# Patient Record
Sex: MALE | Race: OTHER
[De-identification: names, ages, dates, MRNs, and addresses within clinical notes are randomized per-mention and may not be internally consistent; named-entity substitution may affect disease eponyms.]

---

## 2021-05-13 ENCOUNTER — HOSPITAL ENCOUNTER (EMERGENCY)
Dept: HOSPITAL 61 - ER | Age: 36
Discharge: HOME | End: 2021-05-13
Payer: SELF-PAY

## 2021-05-13 VITALS — SYSTOLIC BLOOD PRESSURE: 117 MMHG | DIASTOLIC BLOOD PRESSURE: 68 MMHG

## 2021-05-13 VITALS — HEIGHT: 73 IN | BODY MASS INDEX: 29.22 KG/M2 | WEIGHT: 220.46 LBS

## 2021-05-13 DIAGNOSIS — R10.13: Primary | ICD-10-CM

## 2021-05-13 DIAGNOSIS — K21.9: ICD-10-CM

## 2021-05-13 PROCEDURE — 99285 EMERGENCY DEPT VISIT HI MDM: CPT

## 2021-05-13 NOTE — ED.ADGEN
Past Medical History


Past Medical History:  Other


Additional Past Medical Histor:  "acid reflux"


Past Surgical History:  No Surgical History


Smoking Status:  Never Smoker


Alcohol Use:  None





General Adult


EDM:


Chief Complaint:  ABDOMINAL PAIN





HPI:


HPI:





Patient is a 36  year old  male who presents emergency department with 

complaints of epigastric and right upper quadrant pain since approximately 1900.

 Patient reports he just got home from work when the pain began and he vomited 

once.  He denies any blood in his stools, or blood in his vomit.  Patient states

that his last BM was earlier today and it was normal.  He denies any abnormal 

color to his stools.  Patient states he was seen by his primary care doctor at 

[facility] 2 days ago who told him to stop taking his omeprazole for 2 weeks

so that they could do further testing.  Patient is not sure what testing they 

were going to do.  Patient also reports that he has been having frequent 

headaches but denies taking any ibuprofen for relief of the pain.  He currently 

rates the pain 8 out of 10 on the pain scale, he denies any alleviating or 

exacerbating factors, the pain does not get worse with position changes or 

eating.





Review of Systems:


Review of Systems:


Complete ROS is negative unless otherwise noted in HPI.





Current Medications:





Current Medications








 Medications


  (Trade)  Dose


 Ordered  Sig/Vicky  Start Time


 Stop Time Status Last Admin


Dose Admin


 


 Multi-Ingredient


 Mouthwash/Gargle


  (Gi Cocktail)  20 ml  1X  ONCE  5/13/21 21:30


 5/13/21 21:31 DC  














Allergies:


Allergies:





Allergies








Coded Allergies Type Severity Reaction Last Updated Verified


 


  No Known Drug Allergies    5/13/21 No











Physical Exam:


PE:


See Above


Constitutional: Well developed, well nourished, no acute distress, non-toxic 

appearance. []


HENT: Normocephalic, atraumatic, bilateral external ears normal, nose normal. []


Eyes: PERRLA, EOMI, conjunctiva normal, no discharge. [] 


Neck: Normal range of motion, no stridor. [] 


Cardiovascular:Heart rate regular rhythm


Lungs & Thorax:  Respirations even and unlabored, no retractions, no respiratory

 distress


Abdomen: soft, epigastric tenderness to palpation, right upper quadrant 

tenderness to palpation, no rebound tenderness, no guarding, no palpable mass


Skin: Warm, dry, no erythema, no rash. [] 


Extremities: No cyanosis, ROM intact, no edema. [] 


Neurologic: Alert and oriented X 3, no focal deficits noted. []


Psychologic: Affect normal, judgement normal, mood normal. []





Current Patient Data:


Vital Signs:





                                   Vital Signs








  Date Time  Temp Pulse Resp B/P (MAP) Pulse Ox O2 Delivery O2 Flow Rate FiO2


 


5/13/21 20:57 98.1 93 20 131/72 (91) 98 Room Air  





 98.1       











EKG:


EKG:


[]





Heart Score:


C/O Chest Pain:  No


Risk Scores:


Score 0 - 3:  2.5% MACE over next 6 weeks - Discharge Home


Score 4 - 6:  20.3% MACE over next 6 weeks - Admit for Clinical Observation


Score 7 - 10:  72.7% MACE over next 6 weeks - Early Invasive Strategies





Radiology/Procedures:


Radiology/Procedures:


[]





Course & Med Decision Making:


Course & Med Decision Making


Pertinent Labs and Imaging studies reviewed. (See chart for details)


36-year-old male who presents emergency department with complaints of nausea, 

vomiting, and epigastric pain after stopping taking his omeprazole 2 days prior 

to the visit.


Patient was given a GI cocktail in the emergency department.  He reported 

feeling better after taking this medication.  I offered to do labs and imaging 

but the patient declined stating he was feeling better I encouraged the patient 

to take Maalox as needed tonight and call his doctor at [facility] tomorrow 

to find out what he can take for his discomfort.  I advised the patient to avoid

 acidic, caffeinated, carbonated, and spicy foods as these will only cause his 

pain to get worse.  I instructed the patient to return to the emergency room if 

a fever develops or symptoms worsen.  Patient verbalized an understanding of 

these instructions and was in agreement with the plan of care.  The patient's 

girlfriend at the bedside translated for the patient when discussing the plan of

 care and final disposition.  The [employer]  line was used to speak 

with the patient during the HPI.  Patient's vital signs are stable in the ER.





[]





Dragon Disclaimer:


Dragon Disclaimer:


This electronic medical record was generated, in whole or in part, using a voice

 recognition dictation system.





Departure


Departure


Impression:  


   Primary Impression:  


   Epigastric pain


Disposition:  01 HOME / SELF CARE / HOMELESS


Condition:  STABLE


Patient Instructions:  Diet for Gastroesophageal Reflux Disease, Adult, 

Easy-to-Read





Additional Instructions:  


Recommend use of over-the-counter Maalox as needed for abdominal pain.  Follow 

the diet instructions provided.  Call your doctor at [facility] tomorrow, 

return to the ER if symptoms worsen or fever develops.











CORINA BEY       May 13, 2021 21:23